# Patient Record
Sex: MALE | Race: WHITE | URBAN - METROPOLITAN AREA
[De-identification: names, ages, dates, MRNs, and addresses within clinical notes are randomized per-mention and may not be internally consistent; named-entity substitution may affect disease eponyms.]

---

## 2021-08-12 ENCOUNTER — OFFICE VISIT (OUTPATIENT)
Dept: URBAN - METROPOLITAN AREA CLINIC 92 | Facility: CLINIC | Age: 46
End: 2021-08-12
Payer: COMMERCIAL

## 2021-08-12 VITALS
TEMPERATURE: 98 F | SYSTOLIC BLOOD PRESSURE: 128 MMHG | DIASTOLIC BLOOD PRESSURE: 86 MMHG | HEIGHT: 72 IN | BODY MASS INDEX: 25.47 KG/M2 | HEART RATE: 78 BPM | WEIGHT: 188 LBS

## 2021-08-12 DIAGNOSIS — R10.84 GENERALIZED ABDOMINAL PAIN: ICD-10-CM

## 2021-08-12 PROCEDURE — 99213 OFFICE O/P EST LOW 20 MIN: CPT | Performed by: INTERNAL MEDICINE

## 2021-08-12 RX ORDER — HYOSCYAMINE SULFATE 0.12 MG/1
PLACE 1 TABLET UNDER TONGUE BY TRANSLINGUAL ROUTE Q 8 HOURS PRN PAIN TABLET, ORALLY DISINTEGRATING ORAL
Qty: 30 | Refills: 0 | Status: ACTIVE | COMMUNITY
Start: 2020-01-28

## 2021-08-12 NOTE — HPI-TODAY'S VISIT:
Mild abdominal cramping and left lower quadrant discomfort.  Started 1 month ago while traveling in [place] and began watery diarrhea.  Stool studies were negative per patient report including blood work.  He was given empiric antibiotics which resolved diarrhea.  Continues with some mild abdominal cramping worse with fatty meals.  Denies any weight loss, fevers chills, or upper GI symptoms.  2020 with EGD showing small hiatal hernia and reflux esophagitis.  Colonoscopy in 2020 was normal including biopsies for abdominal pain.  CT scan showed small gallstone

## 2023-09-18 ENCOUNTER — OFFICE VISIT (OUTPATIENT)
Dept: URBAN - METROPOLITAN AREA CLINIC 128 | Facility: CLINIC | Age: 48
End: 2023-09-18
Payer: COMMERCIAL

## 2023-09-18 VITALS
HEIGHT: 72 IN | SYSTOLIC BLOOD PRESSURE: 128 MMHG | TEMPERATURE: 97.8 F | WEIGHT: 197.4 LBS | BODY MASS INDEX: 26.74 KG/M2 | DIASTOLIC BLOOD PRESSURE: 72 MMHG | HEART RATE: 63 BPM

## 2023-09-18 DIAGNOSIS — K92.1 HEMATOCHEZIA: ICD-10-CM

## 2023-09-18 DIAGNOSIS — K64.9 HEMORRHOIDS, UNSPECIFIED HEMORRHOID TYPE: ICD-10-CM

## 2023-09-18 DIAGNOSIS — L29.0 ANAL ITCHING: ICD-10-CM

## 2023-09-18 DIAGNOSIS — R10.9 ABDOMINAL PAIN: ICD-10-CM

## 2023-09-18 PROCEDURE — 99214 OFFICE O/P EST MOD 30 MIN: CPT | Performed by: PHYSICIAN ASSISTANT

## 2023-09-18 RX ORDER — HYDROCORTISONE 25 MG/G
1 APPLICATION CREAM TOPICAL TWICE A DAY
Qty: 1 TUBE | Refills: 0 | OUTPATIENT
Start: 2023-09-18 | End: 2023-10-18

## 2023-09-18 RX ORDER — POLYETHYLENE GLYCOL 3350, SODIUM SULFATE ANHYDROUS, SODIUM BICARBONATE, SODIUM CHLORIDE, POTASSIUM CHLORIDE 236; 22.74; 6.74; 5.86; 2.97 G/4L; G/4L; G/4L; G/4L; G/4L
AS DIRECTED POWDER, FOR SOLUTION ORAL ONCE A DAY
Qty: 1 BOTTLE | Refills: 0 | OUTPATIENT
Start: 2023-09-18 | End: 2023-09-19

## 2023-09-18 RX ORDER — HYOSCYAMINE SULFATE 0.12 MG/1
PLACE 1 TABLET UNDER TONGUE BY TRANSLINGUAL ROUTE Q 8 HOURS PRN PAIN TABLET, ORALLY DISINTEGRATING ORAL
Qty: 30 | Refills: 0 | Status: ON HOLD | COMMUNITY
Start: 2020-01-28

## 2023-09-18 NOTE — PHYSICAL EXAM GASTROINTESTINAL
Abdomen , soft, mild tenderness to palpation in the LLQ, nondistended , no guarding or rigidity , no masses palpable , normal bowel sounds, negative psoas and obturators sign, negative Fitch's sign, negative CVA tenderness bilaterally Liver and Spleen , no hepatosplenomegaly Rectal , normal sphincter tone, non-bleeding internal hemorrhoid noted, no external hemorrhoids, no rectal masses, no bleeding present. A chaperone was present during the rectal examination 54 yo M c/o right lower dental pain  x 10 days. Patient states he had fillling refilled to wisdom tooth 10 days ago and has had pain since. He has seen his dentist and was referred to get root canal. Pain is now worse. Unable to chew/eat due to pain. + temp at triage. On abx. No

## 2023-09-18 NOTE — HPI-TODAY'S VISIT:
The patient elicits having anal itching and rectal bleeding Location: LLQ abdominal pain Duration of symptoms: x 1 year Associated symptoms: none Avoiding turkey and chicken has helped Any recent weight changes: none Any recent medication changes: none Previous work-up- labs,imaging, scopes: 2020 with EGD showing small hiatal hernia and reflux esophagitis. Colonoscopy in 2020 was normal including biopsies for abdominal pain. CT scan showed small gallstone in 2020.

## 2023-10-09 ENCOUNTER — LAB OUTSIDE AN ENCOUNTER (OUTPATIENT)
Dept: URBAN - METROPOLITAN AREA CLINIC 128 | Facility: CLINIC | Age: 48
End: 2023-10-09

## 2023-10-10 ENCOUNTER — LAB OUTSIDE AN ENCOUNTER (OUTPATIENT)
Dept: URBAN - METROPOLITAN AREA CLINIC 19 | Facility: CLINIC | Age: 48
End: 2023-10-10

## 2023-10-10 LAB
A/G RATIO: 1.7
ABSOLUTE BASOPHILS: 49
ABSOLUTE EOSINOPHILS: 231
ABSOLUTE LYMPHOCYTES: 2030
ABSOLUTE MONOCYTES: 665
ABSOLUTE NEUTROPHILS: 4025
ALBUMIN: 4.7
ALKALINE PHOSPHATASE: 82
ALT (SGPT): 17
AST (SGOT): 14
BASOPHILS: 0.7
BILIRUBIN, TOTAL: 0.5
BUN/CREATININE RATIO: (no result)
BUN: 10
C-REACTIVE PROTEIN, QUANT: 4.2
CALCIUM: 10.4
CARBON DIOXIDE, TOTAL: 28
CHLORIDE: 101
CREATININE: 1.08
EGFR: 84
EOSINOPHILS: 3.3
GLOBULIN, TOTAL: 2.8
GLUCOSE: 98
HEMATOCRIT: 47.9
HEMOGLOBIN: 15.9
IMMUNOGLOBULIN A: 260
INTERPRETATION: (no result)
LIPASE: 14
LYMPHOCYTES: 29
MCH: 29.1
MCHC: 33.2
MCV: 87.7
MONOCYTES: 9.5
MPV: 9.7
NEUTROPHILS: 57.5
PLATELET COUNT: 250
POTASSIUM: 4.7
PROTEIN, TOTAL: 7.5
RDW: 12.1
RED BLOOD CELL COUNT: 5.46
SODIUM: 141
TISSUE TRANSGLUTAMINASE AB, IGA: <1
WHITE BLOOD CELL COUNT: 7

## 2023-11-01 ENCOUNTER — CLAIMS CREATED FROM THE CLAIM WINDOW (OUTPATIENT)
Dept: URBAN - METROPOLITAN AREA CLINIC 4 | Facility: CLINIC | Age: 48
End: 2023-11-01
Payer: COMMERCIAL

## 2023-11-01 ENCOUNTER — OUT OF OFFICE VISIT (OUTPATIENT)
Dept: URBAN - METROPOLITAN AREA SURGERY CENTER 31 | Facility: SURGERY CENTER | Age: 48
End: 2023-11-01
Payer: COMMERCIAL

## 2023-11-01 DIAGNOSIS — K63.89 OTHER SPECIFIED DISEASES OF INTESTINE: ICD-10-CM

## 2023-11-01 DIAGNOSIS — K92.1 HEMATOCHEZIA: ICD-10-CM

## 2023-11-01 DIAGNOSIS — K64.8 EXTERNAL HEMORRHOIDS: ICD-10-CM

## 2023-11-01 DIAGNOSIS — K63.89 APPENDICITIS EPIPLOICA: ICD-10-CM

## 2023-11-01 DIAGNOSIS — R68.89 ERYTHEMATOUS MUCOSA: ICD-10-CM

## 2023-11-01 DIAGNOSIS — K92.1 ACUTE MELENA: ICD-10-CM

## 2023-11-01 PROCEDURE — 88305 TISSUE EXAM BY PATHOLOGIST: CPT | Performed by: PATHOLOGY

## 2023-11-01 PROCEDURE — 00811 ANES LWR INTST NDSC NOS: CPT | Performed by: NURSE ANESTHETIST, CERTIFIED REGISTERED

## 2023-11-01 PROCEDURE — G8907 PT DOC NO EVENTS ON DISCHARG: HCPCS | Performed by: INTERNAL MEDICINE

## 2023-11-01 PROCEDURE — 45380 COLONOSCOPY AND BIOPSY: CPT | Performed by: INTERNAL MEDICINE

## 2023-12-06 ENCOUNTER — OFFICE VISIT (OUTPATIENT)
Dept: URBAN - METROPOLITAN AREA CLINIC 128 | Facility: CLINIC | Age: 48
End: 2023-12-06

## 2023-12-08 ENCOUNTER — OFFICE VISIT (OUTPATIENT)
Dept: URBAN - METROPOLITAN AREA CLINIC 128 | Facility: CLINIC | Age: 48
End: 2023-12-08
Payer: COMMERCIAL

## 2023-12-08 VITALS
BODY MASS INDEX: 26.44 KG/M2 | WEIGHT: 195.2 LBS | DIASTOLIC BLOOD PRESSURE: 68 MMHG | SYSTOLIC BLOOD PRESSURE: 118 MMHG | TEMPERATURE: 97.9 F | HEIGHT: 72 IN

## 2023-12-08 DIAGNOSIS — Z12.11 COLON CANCER SCREENING: ICD-10-CM

## 2023-12-08 DIAGNOSIS — K64.1 GRADE II HEMORRHOIDS: ICD-10-CM

## 2023-12-08 DIAGNOSIS — R10.84 ABDOMINAL CRAMPING, GENERALIZED: ICD-10-CM

## 2023-12-08 DIAGNOSIS — K92.1 HEMATOCHEZIA: ICD-10-CM

## 2023-12-08 PROCEDURE — 46600 DIAGNOSTIC ANOSCOPY SPX: CPT | Performed by: INTERNAL MEDICINE

## 2023-12-08 PROCEDURE — 99213 OFFICE O/P EST LOW 20 MIN: CPT | Performed by: INTERNAL MEDICINE

## 2023-12-08 PROCEDURE — 46221 LIGATION OF HEMORRHOID(S): CPT | Performed by: INTERNAL MEDICINE

## 2023-12-08 RX ORDER — PANTOPRAZOLE SODIUM 40 MG/1
1 TABLET TABLET, DELAYED RELEASE ORAL ONCE A DAY
Qty: 90 TABLET | Refills: 3 | OUTPATIENT
Start: 2023-12-08

## 2023-12-08 RX ORDER — HYOSCYAMINE SULFATE 0.12 MG/1
PLACE 1 TABLET UNDER TONGUE BY TRANSLINGUAL ROUTE Q 8 HOURS PRN PAIN TABLET, ORALLY DISINTEGRATING ORAL
Qty: 30 | Refills: 0 | Status: ON HOLD | COMMUNITY
Start: 2020-01-28

## 2023-12-08 NOTE — HPI-TODAY'S VISIT:
The patient comes in for evaluation of his hemorrhoids recently seen by LEO Mart on 9/18/2023 LLQ pain evaluated with unremarkalble labs and CT notable for cholelithiasis but otherwise normal he was havin gissugilmar with hemorrhoids as well notes isues with hematochezia on and off.  occurs 1-3 times per month denies any diarrhea or constipation has hx of lactose free diet recently started statin as well also notes epigastric pain/discomfort.  worse when he lays down on his right or left side ok when he lays on his back no assoc n/v pain unrelarted to eating or drinking does note hx of HH no dysphagia stable weight  no other complaints.  Previous work-up- labs,imaging, scopes: 2020 with EGD showing small hiatal hernia and reflux esophagitis. Colonoscopy in 2020 was normal including biopsies for abdominal pain. CT scan showed small gallstone in 2020.

## 2023-12-08 NOTE — PHYSICAL EXAM GASTROINTESTINAL
Abdomen , soft, nontender, nondistended , no guarding or rigidity , no masses palpable , normal bowel sounds , Liver and Spleen , no hepatomegaly present , no hepatosplenomegaly , liver nontender , spleen not palpable , Rectal , normal sphincter tone , no external hemorrhoids or skin tagks.  john with notable tight internal anal sphincter.  no obvious anal fissure, rectal masses or bleeding present.   anoscopy with grade II IH.  s/p banding of left lateral hemorrhoid today

## 2024-01-03 ENCOUNTER — OFFICE VISIT (OUTPATIENT)
Dept: URBAN - METROPOLITAN AREA CLINIC 128 | Facility: CLINIC | Age: 49
End: 2024-01-03
Payer: COMMERCIAL

## 2024-01-03 VITALS
HEIGHT: 72 IN | BODY MASS INDEX: 25.73 KG/M2 | WEIGHT: 190 LBS | SYSTOLIC BLOOD PRESSURE: 142 MMHG | DIASTOLIC BLOOD PRESSURE: 68 MMHG | TEMPERATURE: 97.9 F

## 2024-01-03 DIAGNOSIS — R10.9 ABDOMINAL PAIN: ICD-10-CM

## 2024-01-03 DIAGNOSIS — K64.1 GRADE II HEMORRHOIDS: ICD-10-CM

## 2024-01-03 DIAGNOSIS — K92.1 HEMATOCHEZIA: ICD-10-CM

## 2024-01-03 DIAGNOSIS — L29.0 ANAL ITCHING: ICD-10-CM

## 2024-01-03 PROCEDURE — 99213 OFFICE O/P EST LOW 20 MIN: CPT | Performed by: INTERNAL MEDICINE

## 2024-01-03 PROCEDURE — 46221 LIGATION OF HEMORRHOID(S): CPT | Performed by: INTERNAL MEDICINE

## 2024-01-03 RX ORDER — HYOSCYAMINE SULFATE 0.12 MG/1
PLACE 1 TABLET UNDER TONGUE BY TRANSLINGUAL ROUTE Q 8 HOURS PRN PAIN TABLET, ORALLY DISINTEGRATING ORAL
Qty: 30 | Refills: 0 | Status: ON HOLD | COMMUNITY
Start: 2020-01-28

## 2024-01-03 RX ORDER — PANTOPRAZOLE SODIUM 40 MG/1
1 TABLET TABLET, DELAYED RELEASE ORAL ONCE A DAY
Qty: 90 TABLET | Refills: 3 | Status: ACTIVE | COMMUNITY
Start: 2023-12-08

## 2024-01-03 NOTE — PHYSICAL EXAM GASTROINTESTINAL
Abdomen , soft, nontender, nondistended , no guarding or rigidity , no masses palpable , normal bowel sounds , Liver and Spleen , no hepatomegaly present , no hepatosplenomegaly , liver nontender , spleen not palpable , Rectal , normal sphincter tone , no external hemorrhoids or skin tags.  john with notable tight internal anal sphincter.  no obvious anal fissure, rectal masses or bleeding present.   s/p banding of right posterior hemorrhoid today

## 2024-01-03 NOTE — HPI-TODAY'S VISIT:
The patient comes in for continued treatment of his hemorrhoids recently seen by LEO Mart on 9/18/2023 LLQ pain evaluated with unremarkalble labs and CT notable for cholelithiasis but otherwise normal he was having issues with hemorrhoids as well s/p banding of hi left lateral hemorrhoid last visit on 12/8/2023 he was also given protonix for abdominal pain he has no issues with last banding he noted improvement in his banding he was well until last week he also didn't start his protonix he went to [place] to visit family, while there he notes return of his abd pain and assoc loose stools now with some hematochezia with it.   no rectal pain or itching assoc epigastric pain no n/v no dysphagia  no other complaints he is lactose intolerant and maintains a lactose free diet  no other complaints.  Previous work-up- labs,imaging, scopes: 2020 with EGD showing small hiatal hernia and reflux esophagitis. Colonoscopy in 2020 was normal including biopsies for abdominal pain. CT scan showed small gallstone in 2020.

## 2024-01-17 ENCOUNTER — OFFICE VISIT (OUTPATIENT)
Dept: URBAN - METROPOLITAN AREA CLINIC 80 | Facility: CLINIC | Age: 49
End: 2024-01-17
Payer: COMMERCIAL

## 2024-01-17 ENCOUNTER — OFFICE VISIT (OUTPATIENT)
Dept: URBAN - METROPOLITAN AREA CLINIC 128 | Facility: CLINIC | Age: 49
End: 2024-01-17

## 2024-01-17 ENCOUNTER — DASHBOARD ENCOUNTERS (OUTPATIENT)
Age: 49
End: 2024-01-17

## 2024-01-17 VITALS
SYSTOLIC BLOOD PRESSURE: 151 MMHG | DIASTOLIC BLOOD PRESSURE: 88 MMHG | WEIGHT: 193.6 LBS | BODY MASS INDEX: 26.22 KG/M2 | HEART RATE: 64 BPM | TEMPERATURE: 99 F | HEIGHT: 72 IN

## 2024-01-17 DIAGNOSIS — K92.1 HEMATOCHEZIA: ICD-10-CM

## 2024-01-17 DIAGNOSIS — L29.0 ANAL ITCHING: ICD-10-CM

## 2024-01-17 DIAGNOSIS — K64.1 GRADE II HEMORRHOIDS: ICD-10-CM

## 2024-01-17 DIAGNOSIS — R10.9 ABDOMINAL PAIN: ICD-10-CM

## 2024-01-17 PROCEDURE — 99213 OFFICE O/P EST LOW 20 MIN: CPT | Performed by: INTERNAL MEDICINE

## 2024-01-17 PROCEDURE — 46221 LIGATION OF HEMORRHOID(S): CPT | Performed by: INTERNAL MEDICINE

## 2024-01-17 RX ORDER — HYOSCYAMINE SULFATE 0.12 MG/1
PLACE 1 TABLET UNDER TONGUE BY TRANSLINGUAL ROUTE Q 8 HOURS PRN PAIN TABLET, ORALLY DISINTEGRATING ORAL
Qty: 30 | Refills: 0 | Status: ON HOLD | COMMUNITY
Start: 2020-01-28

## 2024-01-17 RX ORDER — PANTOPRAZOLE SODIUM 40 MG/1
1 TABLET TABLET, DELAYED RELEASE ORAL ONCE A DAY
Qty: 90 TABLET | Refills: 3 | Status: ACTIVE | COMMUNITY
Start: 2023-12-08

## 2024-01-17 NOTE — HPI-TODAY'S VISIT:
The patient comes in for continued treatment of his hemorrhoids recently seen by LEO Mart on 9/18/2023 LLQ pain evaluated with unremarkalble labs and CT notable for cholelithiasis but otherwise normal he was having issues with hemorrhoids as well s/p banding of his left lateral hemorrhoid last visit on 12/8/2023 s/p banding of right posterior hemorrhoid on 1/3/2024 here for continued banding  he notes significant improvement after last banding noted one episode of hematochezia but no further rectal discomort he was having left periumbilical pain he started his protonix last visit now much improved but ongoing symptoms no n/v no dysphagia pain worse with sitting and mainly positional normal stools does work out with heavy squats and lifting  no other complaints he is lactose intolerant and maintains a lactose free diet  no other complaints.  Previous work-up- labs,imaging, scopes: 2020 with EGD showing small hiatal hernia and reflux esophagitis. Colonoscopy in 2020 was normal including biopsies for abdominal pain. CT scan showed small gallstone in 2020.

## 2024-01-17 NOTE — PHYSICAL EXAM GASTROINTESTINAL
Abdomen , soft, nontender, nondistended , no guarding or rigidity , no masses palpable , normal bowel sounds , Liver and Spleen , no hepatomegaly present , no hepatosplenomegaly , liver nontender , spleen not palpable , Rectal , normal sphincter tone , no external hemorrhoids or skin tags.  previously seen hypertonic anal sphincter appears somewhat improved today.  s/p banding of right anterior hemorrhoid today

## 2025-03-26 ENCOUNTER — OFFICE VISIT (OUTPATIENT)
Dept: URBAN - METROPOLITAN AREA CLINIC 128 | Facility: CLINIC | Age: 50
End: 2025-03-26
Payer: COMMERCIAL

## 2025-03-26 DIAGNOSIS — K80.20 CALCULUS OF GALLBLADDER WITHOUT CHOLECYSTITIS WITHOUT OBSTRUCTION: ICD-10-CM

## 2025-03-26 DIAGNOSIS — K64.1 GRADE II HEMORRHOIDS: ICD-10-CM

## 2025-03-26 DIAGNOSIS — Z12.11 COLON CANCER SCREENING: ICD-10-CM

## 2025-03-26 DIAGNOSIS — K92.1 HEMATOCHEZIA: ICD-10-CM

## 2025-03-26 DIAGNOSIS — R10.9 ABDOMINAL PAIN: ICD-10-CM

## 2025-03-26 PROBLEM — 70342003: Status: ACTIVE | Noted: 2025-03-26

## 2025-03-26 PROCEDURE — 99213 OFFICE O/P EST LOW 20 MIN: CPT | Performed by: PHYSICIAN ASSISTANT

## 2025-03-26 RX ORDER — HYOSCYAMINE SULFATE 0.12 MG/1
PLACE 1 TABLET UNDER TONGUE BY TRANSLINGUAL ROUTE Q 8 HOURS PRN PAIN TABLET, ORALLY DISINTEGRATING ORAL
Qty: 30 | Refills: 0 | Status: DISCONTINUED | COMMUNITY
Start: 2020-01-28

## 2025-03-26 RX ORDER — FAMOTIDINE 20 MG/1
1 TABLET AT BEDTIME TABLET, FILM COATED ORAL ONCE A DAY
Qty: 30 TABLET | Refills: 5 | OUTPATIENT
Start: 2025-03-26

## 2025-03-26 RX ORDER — PANTOPRAZOLE SODIUM 40 MG/1
1 TABLET TABLET, DELAYED RELEASE ORAL ONCE A DAY
Qty: 90 TABLET | Refills: 3 | Status: DISCONTINUED | COMMUNITY
Start: 2023-12-08

## 2025-03-26 NOTE — PHYSICAL EXAM GASTROINTESTINAL
Abdomen , soft, tenderness to palpation in the RUQ,  nondistended , no guarding or rigidity , no masses palpable , normal bowel sounds, old surgical scars, negative CVA tenderness bilaterally Liver and Spleen , no hepatosplenomegaly Rectal deferred

## 2025-03-26 NOTE — HPI-TODAY'S VISIT:
The patient comes in for re-evaluation of his epigastric and RUQ abdominal pain.  2023 CT was notable for cholelithiasis but otherwise normal he was having issues with hemorrhoids as well but this has resolved s/p hemorrhoid banding s/p banding of his left lateral hemorrhoid last visit on 12/8/2023 s/p banding of right posterior hemorrhoid on 1/3/2024 Elicits occasional heartburn, stopped pantoprazole  no n/v no dysphagia pain worse with sitting and mainly positional, worsened by laying on his left side at night normal stools does work out with heavy squats and lifting  no other complaints he is lactose intolerant and maintains a lactose free diet Previous work-up- labs,imaging, scopes: 2020 with EGD showing small hiatal hernia and reflux esophagitis. Colonoscopy in 2020 was normal including biopsies for abdominal pain. CT scan showed small gallstones in 2020.

## 2025-04-03 ENCOUNTER — LAB OUTSIDE AN ENCOUNTER (OUTPATIENT)
Dept: URBAN - METROPOLITAN AREA CLINIC 128 | Facility: CLINIC | Age: 50
End: 2025-04-03

## 2025-06-25 ENCOUNTER — OFFICE VISIT (OUTPATIENT)
Dept: URBAN - METROPOLITAN AREA CLINIC 128 | Facility: CLINIC | Age: 50
End: 2025-06-25

## 2025-07-07 ENCOUNTER — OFFICE VISIT (OUTPATIENT)
Dept: URBAN - METROPOLITAN AREA CLINIC 128 | Facility: CLINIC | Age: 50
End: 2025-07-07